# Patient Record
Sex: MALE | Race: WHITE | NOT HISPANIC OR LATINO
[De-identification: names, ages, dates, MRNs, and addresses within clinical notes are randomized per-mention and may not be internally consistent; named-entity substitution may affect disease eponyms.]

---

## 2024-08-05 ENCOUNTER — APPOINTMENT (OUTPATIENT)
Dept: ORTHOPEDIC SURGERY | Facility: CLINIC | Age: 74
End: 2024-08-05

## 2024-08-05 PROBLEM — I10 HYPERTENSION: Status: RESOLVED | Noted: 2024-08-05 | Resolved: 2024-08-05

## 2024-08-05 PROBLEM — Z85.528 HISTORY OF MALIGNANT NEOPLASM OF KIDNEY: Status: RESOLVED | Noted: 2024-08-05 | Resolved: 2024-08-05

## 2024-08-05 PROBLEM — Z87.39 HISTORY OF TENDINITIS: Status: RESOLVED | Noted: 2024-08-05 | Resolved: 2024-08-05

## 2024-08-05 PROBLEM — Z00.00 ENCOUNTER FOR PREVENTIVE HEALTH EXAMINATION: Status: ACTIVE | Noted: 2024-08-05

## 2024-08-05 PROCEDURE — 99203 OFFICE O/P NEW LOW 30 MIN: CPT

## 2024-08-05 PROCEDURE — 73140 X-RAY EXAM OF FINGER(S): CPT | Mod: RT

## 2024-08-05 NOTE — HISTORY OF PRESENT ILLNESS
[2] : 2 [Dull/Aching] : dull/aching [Localized] : localized [Intermittent] : intermittent [Household chores] : household chores [Rest] : rest [de-identified] : 74 year old male presents with RT hand ring finger pain. He presents with a bump on the finger. In fluctuates in size. As he uses it it become larger.  [] : no [FreeTextEntry1] : rt hand ring finger [FreeTextEntry5] : 2.5 wks. of pain, no injury, no fall [de-identified] : squeezing

## 2024-08-05 NOTE — DISCUSSION/SUMMARY
[de-identified] : Discussed the nature of the diagnosis and risk and benefits of different modalities of treatment. Ganglion cyst of rt ring finger  He will conservatively manage with warm compress  RTO 3 weeks

## 2024-08-05 NOTE — HISTORY OF PRESENT ILLNESS
[2] : 2 [Dull/Aching] : dull/aching [Localized] : localized [Intermittent] : intermittent [Household chores] : household chores [Rest] : rest [de-identified] : 74 year old male presents with RT hand ring finger pain. He presents with a bump on the finger. In fluctuates in size. As he uses it it become larger.  [] : no [FreeTextEntry1] : rt hand ring finger [FreeTextEntry5] : 2.5 wks. of pain, no injury, no fall [de-identified] : squeezing

## 2024-08-05 NOTE — DISCUSSION/SUMMARY
[de-identified] : Discussed the nature of the diagnosis and risk and benefits of different modalities of treatment. Ganglion cyst of rt ring finger  He will conservatively manage with warm compress  RTO 3 weeks

## 2024-08-05 NOTE — PHYSICAL EXAM
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

## 2024-08-29 ENCOUNTER — APPOINTMENT (OUTPATIENT)
Dept: ORTHOPEDIC SURGERY | Facility: CLINIC | Age: 74
End: 2024-08-29

## 2024-08-29 DIAGNOSIS — M67.449 GANGLION, UNSPECIFIED HAND: ICD-10-CM

## 2024-08-29 PROCEDURE — 99213 OFFICE O/P EST LOW 20 MIN: CPT | Mod: 25

## 2024-08-29 PROCEDURE — 20612 ASPIRATE/INJ GANGLION CYST: CPT | Mod: RT

## 2024-08-29 NOTE — PROCEDURE
[Other: ____] : [unfilled] [Right] : of the right [4th] : 4th [Pain] : pain [Inflammation] : inflammation [Alcohol] : alcohol [Ethyl Chloride sprayed topically] : ethyl chloride sprayed topically [Sterile technique used] : sterile technique used [___ cc    1%] : Lidocaine ~Vcc of 1%  [___ cc    10mg] : Triamcinolone (Kenalog) ~Vcc of 10 mg  [Risks, benefits, alternatives discussed / Verbal consent obtained] : the risks benefits, and alternatives have been discussed, and verbal consent was obtained

## 2024-08-29 NOTE — HISTORY OF PRESENT ILLNESS
[5] : 5 [2] : 2 [Dull/Aching] : dull/aching [Localized] : localized [Intermittent] : intermittent [Household chores] : household chores [Rest] : rest [de-identified] : 74 year old male  followed for RT Index ganglion cyst. he has been ,managing conservatively he comes in wanting a CSI  [] : no [FreeTextEntry1] : rt hand ring finger [FreeTextEntry5] : NKI  [de-identified] : squeezing [de-identified] : Patient has been soaking finger in warm water and taking ibuprofen

## 2024-08-29 NOTE — DISCUSSION/SUMMARY
[de-identified] : Discussed the nature of the diagnosis and risk and benefits of different modalities of treatment. Ganglion cyst of rt ring finger  Discussion of CSI Done today and tolerated well  All questions answered

## 2025-08-11 ENCOUNTER — OFFICE (OUTPATIENT)
Dept: URBAN - METROPOLITAN AREA CLINIC 88 | Facility: CLINIC | Age: 75
Setting detail: OPHTHALMOLOGY
End: 2025-08-11
Payer: MEDICARE

## 2025-08-11 DIAGNOSIS — H25.13: ICD-10-CM

## 2025-08-11 DIAGNOSIS — H43.811: ICD-10-CM

## 2025-08-11 DIAGNOSIS — H35.363: ICD-10-CM

## 2025-08-11 PROCEDURE — 92004 COMPRE OPH EXAM NEW PT 1/>: CPT | Performed by: OPHTHALMOLOGY

## 2025-08-11 PROCEDURE — 92134 CPTRZ OPH DX IMG PST SGM RTA: CPT | Performed by: OPHTHALMOLOGY

## 2025-08-11 PROCEDURE — 92235 FLUORESCEIN ANGRPH MLTIFRAME: CPT | Performed by: OPHTHALMOLOGY

## 2025-08-11 ASSESSMENT — CONFRONTATIONAL VISUAL FIELD TEST (CVF)
OS_FINDINGS: FULL
OD_FINDINGS: FULL

## 2025-08-11 ASSESSMENT — TONOMETRY
OS_IOP_MMHG: 14
OD_IOP_MMHG: 16

## 2025-08-13 PROBLEM — H25.13 CATARACT SENILE NUCLEAR SCLEROSIS; BOTH EYES: Status: ACTIVE | Noted: 2025-08-11

## 2025-08-13 PROBLEM — H35.363 DRUSEN; BOTH EYES: Status: ACTIVE | Noted: 2025-08-11

## 2025-08-13 PROBLEM — H43.811 POSTERIOR VITREOUS DETACHMENT; RIGHT EYE: Status: ACTIVE | Noted: 2025-08-11

## 2025-08-13 ASSESSMENT — VISUAL ACUITY
OD_BCVA: 20/40
OS_BCVA: 20/30